# Patient Record
Sex: FEMALE | Race: BLACK OR AFRICAN AMERICAN | Employment: FULL TIME | URBAN - METROPOLITAN AREA
[De-identification: names, ages, dates, MRNs, and addresses within clinical notes are randomized per-mention and may not be internally consistent; named-entity substitution may affect disease eponyms.]

---

## 2021-07-19 ENCOUNTER — OFFICE VISIT (OUTPATIENT)
Dept: SURGERY | Age: 26
End: 2021-07-19

## 2021-07-19 ENCOUNTER — HOSPITAL ENCOUNTER (OUTPATIENT)
Dept: LAB | Age: 26
Discharge: HOME | End: 2021-07-19
Payer: COMMERCIAL

## 2021-07-19 VITALS
RESPIRATION RATE: 16 BRPM | HEART RATE: 79 BPM | DIASTOLIC BLOOD PRESSURE: 86 MMHG | WEIGHT: 293 LBS | OXYGEN SATURATION: 99 % | HEIGHT: 68 IN | SYSTOLIC BLOOD PRESSURE: 131 MMHG | BODY MASS INDEX: 44.41 KG/M2

## 2021-07-19 DIAGNOSIS — E13.10 DIABETIC KETOACIDOSIS WITHOUT COMA ASSOCIATED WITH OTHER SPECIFIED DIABETES MELLITUS (HCC): ICD-10-CM

## 2021-07-19 DIAGNOSIS — E66.01 MORBID OBESITY WITH BODY MASS INDEX (BMI) OF 40.0 TO 49.9 (HCC): Primary | ICD-10-CM

## 2021-07-19 DIAGNOSIS — I10 HYPERTENSION, UNSPECIFIED TYPE: ICD-10-CM

## 2021-07-19 DIAGNOSIS — K21.9 GASTROESOPHAGEAL REFLUX DISEASE, UNSPECIFIED WHETHER ESOPHAGITIS PRESENT: ICD-10-CM

## 2021-07-19 DIAGNOSIS — O24.419 GESTATIONAL DIABETES MELLITUS (GDM), ANTEPARTUM, GESTATIONAL DIABETES METHOD OF CONTROL UNSPECIFIED: ICD-10-CM

## 2021-07-19 DIAGNOSIS — M25.50 ARTHRALGIA, UNSPECIFIED JOINT: ICD-10-CM

## 2021-07-19 DIAGNOSIS — E66.01 MORBID OBESITY (HCC): ICD-10-CM

## 2021-07-19 PROCEDURE — 86677 HELICOBACTER PYLORI ANTIBODY: CPT

## 2021-07-19 PROCEDURE — 36415 COLL VENOUS BLD VENIPUNCTURE: CPT

## 2021-07-19 PROCEDURE — 99215 OFFICE O/P EST HI 40 MIN: CPT | Performed by: SPECIALIST

## 2021-07-19 RX ORDER — LORATADINE 10 MG/1
TABLET ORAL
COMMUNITY
Start: 2021-06-21

## 2021-07-19 RX ORDER — ERGOCALCIFEROL 1.25 MG/1
50000 CAPSULE ORAL
COMMUNITY
Start: 2021-07-07 | End: 2021-09-29

## 2021-07-19 NOTE — PATIENT INSTRUCTIONS
Ensure all pre-operative insurances requirements are complete (ie; dietary visits, psychology consults, primary care documentation, etc)    Adhere to pre-operative weight loss / weight maintenance plan discussed in the office today.     Contact the office with any questions on pre-operative clearance issues (ie; cardiology work-up, pulmonary work-up, upper GI study, ect)

## 2021-07-19 NOTE — PROGRESS NOTES
Bariatric Surgery Consultation    Subjective: The patient is a 30 y.o. obese female with a Body mass index is 45.07 kg/m². .  The patient is currently her heaviest weight for the past several years. she has been overweight since her teen years. she has been considering surgery since last year. she desires surgery at this time because of multiple health concerns and their lifestyle issues which are hindered by their weight. she has been referred by his family physician for evaluation and treatment of their obesity via surgical intervention. Sayra Medrano has tried multiple diets in her lifetime most recently tried behavior modification and unsupervised diets    Bariatric comorbidities present are   Patient Active Problem List   Diagnosis Code    Morbid obesity ([facility] E66.01    Morbid obesity with body mass index (BMI) of 40.0 to 49.9 ([facility]) E66.01    Hypertension I10    Gestational diabetes O24.419    GERD (gastroesophageal reflux disease) K21.9    Arthritic-like pain M25.50    Diabetic keto-acidosis ([facility] E11.10       The patient is considering laparoscopic gastric bypass surgery for surgical weight loss due to their ineffective progress with medical forms of weight loss and the urging of their physician who cares for their primary medical issues. The patient  now presents  for consideration for weight loss surgery understanding the benefits of this over a medical approach of weight loss as was discussed in our presentation on weight loss surgery. They have discussed their plans both with their family and primary care physician who is in support of their pursuit of such. The patient has had no health issues as of late and denies and gastrointestinal disturbances other than what is outlined below in their review of symptoms.  All of their prior evaluations available by both their PCP's and specialists physicians have been reviewed today either in the Care Everywhere portal or scanned under the media tab.    I have spent a large portion of my initial consultation today reviewing the patients current dietary habits which have contributed to their health issues and obesity. I have suggested to them personally a dietary regimen that they can initiate now to help with their status as it pertains to their weight. They understand that the most important aspect of their journey through their weight loss endeavor will be their adherence to a new lifestyle of healthy eating behavior. They also understand that an adherence to an exercise program will not only help with weight loss but is ultimately important in weight maintenance. The patients goal weight is 177 lb. Patient Active Problem List    Diagnosis Date Noted    [facility]     Morbid obesity with body mass index (BMI) of 40.0 to 49.9 ([facility]     Hypertension     Gestational diabetes     GERD (gastroesophageal reflux disease)     Arthritic-like pain     Diabetic keto-acidosis ([facility]       Past Surgical History:   Procedure Laterality Date    HX WISDOM TEETH EXTRACTION        Social History     Tobacco Use    Smoking status: Never Smoker    Smokeless tobacco: Never Used   Substance Use Topics    Alcohol use: Not Currently      Family History   Problem Relation Age of Onset    Hypertension Mother     Diabetes Father     Hypertension Father       Current Outpatient Medications   Medication Sig Dispense Refill    ergocalciferol (ERGOCALCIFEROL) 1,250 mcg (50,000 unit) capsule Take 50,000 Units by mouth.       Allergy Relief, loratadine, 10 mg tablet        No Known Allergies     Review of Systems:        General - No history or complaints of unexpected fever, chills, or weight loss  Head/Neck - No history or complaints of headache, diplopia, dysphagia, hearing loss  Cardiac - No history or complaints of chest pain, palpitations, murmur, or shortness of breath  Pulmonary - No history or complaints of shortness of breath, productive cough, hemoptysis  Gastrointestinal - mild reflux noted which is controlled with OTC meds, no abdominal pain, obstipation/constipation or blood per rectum  Genitourinary - No history or complaints of hematuria/dysuria, stress urinary incontinence symptoms, or renal lithiasis  Musculoskeletal - mild joint pain in their hips,  no muscular weakness  Hematologic - No history or complaints of bleeding disorders,  No blood transfusions  Neurologic - No history or complaints of  migraine headaches, seizure activity, syncopal episodes, TIA or stroke  Integumentary - No history or complaints of rashes, abnormal nevi, skin cancer  Gynecological - unremarkable    Objective:     Visit Vitals  /86 (BP 1 Location: Left upper arm, BP Patient Position: Sitting, BP Cuff Size: Large adult)   Pulse 79   Resp 16   Ht 5' 8\" (1.727 m)   Wt 134.4 kg (296 lb 6.4 oz)   SpO2 99%   BMI 45.07 kg/m²       Physical Examination: General appearance - alert, well appearing, and in no distress  Mental status - alert, oriented to person, place, and time  Eyes - pupils equal and reactive, extraocular eye movements intact  Ears - bilateral TM's and external ear canals normal  Nose - normal and patent, no erythema, discharge or polyps  Mouth - mucous membranes moist, pharynx normal without lesions  Neck - supple, no significant adenopathy  Lymphatics - no palpable lymphadenopathy, no hepatosplenomegaly  Chest - clear to auscultation, no wheezes, rales or rhonchi, symmetric air entry  Heart - normal rate, regular rhythm, normal S1, S2, no murmurs, rubs, clicks or gallops  Abdomen - soft, nontender, nondistended, no masses or organomegaly  Back exam - full range of motion, no tenderness, palpable spasm or pain on motion  Neurological - alert, oriented, normal speech, no focal findings or movement disorder noted  Musculoskeletal - no joint tenderness, deformity or swelling  Extremities - peripheral pulses normal, no pedal edema, no clubbing or cyanosis  Skin - normal coloration and turgor, no rashes, no suspicious skin lesions noted    Labs:     No results found for: WBC, WBCLT, HGBPOC, HGB, HGBP, HCTPOC, HCT, PHCT, RBCH, PLT, MCV, HGBEXT, HCTEXT, PLTEXT  No results found for: NA, K, CL, CO2, AGAP, GLU, BUN, CREA, BUCR, GFRAA, GFRNA, CA, TBIL, TBILI, AP, TP, ALB, GLOB, AGRAT, ALT  No results found for: IRON, FE, TIBC, IBCT, PSAT, FERR  No results found for: FOL, RBCF  No results found for: VITD3, XQVID2, XQVID3, XQVID, VD3RIA      Assessment:     Morbid obesity with associated comorbidity    Plan:     laparoscopic gastric bypass surgery    This is a 30 y.o. female with a BMI of Body mass index is 45.07 kg/m². and the weight-related co-morbidties of (as above). Jared Ware meets the NIH criteria for bariatric surgery based upon the BMI of Body mass index is 45.07 kg/m². and multiple weight-related co-morbidties. Jared Ware has elected laparoscopic gastric bypass as her intervention of choice for treatment of morbid obestiy through surgical means secondary to its uniform results,  profound baseline suppression of hunger and pace at which weight is lost.    In the office today, following Ifrah's history and physical examination, a 30 minute discussion regarding the anatomic alterations for the laparoscopic gastric bypass  was undertaken. The dietary expectations and the patient  dependent factors for success were thoroughly discussed, to include the need for interval follow-up and long-term dietary changes associated with success. The possible short and long term  complications of the gastric bypass were also discussed, to include but not limited to;death, DVT/PE, staple line leak, bleeding, stricture formation, infection,internal hernia  and pouch dilation.   Specific weight related outcomes for success were also discussed with an emphasis on careful and close follow-up with the first year and dietary behavior modification over the first years as baseline cyclical hunger returns  The patient expressed an understanding of the above factors, and her questions were answered in their entirety. In addition, the patient attended a 1.5 hour power point seminar regarding obesity, surgical weight loss including, adjustable gastric band, gastric bypass, and sleeve gastrectomy. This discussion contrasted the different surgical techniques, mechanisms of actions and expected outcomes, and surgical and medical risks associated with each procedure. During this seminar, there was a long question and answer session where each questions was answered until there were no additional questions. Today, the patient had all of her questions answered and desires to proceed with  bariatric surgery initially choosing the gastric bypass as her surgical option. Labs from last month reviewed in Care Everywhere to include normal H&H and TSH. She will meet with office staff today to discuss criteria    Secondary Diagnoses:     Dietary Intervention  - The patient is currently scheduled to see or has been followed by a bariatric nutritionist for an attempt at preoperative weight loss as has been dictated by their insurance carrier. They will be assessed at various times during their follow up to evaluate their progress depending on the length of time that is required once again by their carrier. I have explained the importance of preoperative weight loss and the benefits regarding lower surgical risk and also assisting the patient in reaching their weight loss goal.  Finally they understand their is a physiologic benefit from the standpoint of hepatic volume reduction preoperatively. I have reiterated the importance of a low carbohydrate and high protein regimen to achieve their stated goal.     GERD -The patient understands that weight loss surgery is not a guaranteed cure for reflux disease but does understand the benefits that weight loss can have on reflux disease.   They also understand that at the time of surgery the gastroesophageal junction will be evaluated for the presence of a diaphragmatic hernia. Hernias will be corrected always with the gastric band and sleeve gastrectomy procedures, but only on a case by case basis with the gastric bypass if it prevents our ability to perform the operation at hand, or if I feel that they would benefit long term with correction of this issue. The patient also understands that neither weight loss surgery nor repair of a diaphragmatic hernia repair guarantees the complete cessation of the disease. They also understand there is a possibility of recurrence with a simple crural repair as is performed with these procedures. They understand they may have to continue their medications in the postoperative period. They have a good understanding that the gastric bypass procedure is better suited to total resolution of this issue and that neither the Lap Band nor sleeve gastrectomy is considered a curative procedure as it pertains to this diagnosis. Hypertension - The patient has a clear understanding of how weight loss improves hypertension as a whole, but also they understand that there is a significant genetic component to this disease process. We will monitor the patients blood pressure while in the hospital and the plan would be to continue those medications postoperatively.  If a diuretic is being used we will stop them on discharge to prevent dehydration particularly with the sleeve gastrectomy and the gastric bypass procedures.  They will be instructed to monitor their blood pressure postoperatively while at home and notify their primary care physician in the event of any significantly high or uncharacteristic readings.     Signed By: KAVITHA Pope     July 19, 2021

## 2021-07-20 LAB
H PYLORI IGA SER-ACNC: 11.9 UNITS (ref 0–8.9)
H PYLORI IGG SER IA-ACNC: 4.45 INDEX VALUE (ref 0–0.79)

## 2021-07-23 ENCOUNTER — TELEPHONE (OUTPATIENT)
Dept: SURGERY | Age: 26
End: 2021-07-23

## 2021-07-23 RX ORDER — CLARITHROMYCIN 500 MG/1
500 TABLET, FILM COATED ORAL 2 TIMES DAILY
Qty: 28 TABLET | Refills: 0 | Status: SHIPPED | OUTPATIENT
Start: 2021-07-23 | End: 2021-08-06

## 2021-07-23 RX ORDER — OMEPRAZOLE 20 MG/1
20 CAPSULE, DELAYED RELEASE ORAL
Qty: 28 CAPSULE | Refills: 0 | Status: SHIPPED | OUTPATIENT
Start: 2021-07-23 | End: 2021-08-06

## 2021-07-23 RX ORDER — METRONIDAZOLE 500 MG/1
500 TABLET ORAL 2 TIMES DAILY
Qty: 28 TABLET | Refills: 0 | Status: SHIPPED | OUTPATIENT
Start: 2021-07-23 | End: 2021-08-06

## 2021-07-23 NOTE — TELEPHONE ENCOUNTER
I called patient and explained that she has an H pylori infection and gave patient education on the condition. I verified that she has no drug allergies and sent triple therapy to her pharmacy. Patient is to contact our office with any problems, worsening of condition, questions or concerns.

## 2021-07-23 NOTE — TELEPHONE ENCOUNTER
----- Message from Sosa [place] sent at 7/22/2021  9:20 AM EDT -----  Please treat for h pylori

## 2021-08-03 PROBLEM — E66.01 MORBID OBESITY (HCC): Status: RESOLVED | Noted: 2021-08-03 | Resolved: 2021-08-03

## 2021-08-05 ENCOUNTER — OFFICE VISIT (OUTPATIENT)
Dept: SURGERY | Age: 26
End: 2021-08-05

## 2021-08-05 VITALS — WEIGHT: 293 LBS | BODY MASS INDEX: 44.41 KG/M2 | HEIGHT: 68 IN

## 2021-08-05 DIAGNOSIS — E66.01 MORBID OBESITY (HCC): Primary | ICD-10-CM

## 2021-08-05 NOTE — PROGRESS NOTES
Medical Weight Loss Multi-Disciplinary Program    Name: Mihir Hassan   : 1995    Session# 1, Pt attended in-person class. Weight obtained in office. Date: 2021    Visit Vitals  Ht 5' 8\" (1.727 m)   Wt 133.4 kg (294 lb 3.2 oz)   BMI 44.73 kg/m²       Pt has LOST 2.2  lbs since initial consult visit on 2021. Dietary Instructions    Reviewed intake  Understanding low carbohydrates, low sugar, higher protein meals  Instruction given for personal dietary changes  Discussed perceived compliance  Comments: RD Reviewed Diet History and Physical Activity/Exercise habits. Recommended dietary changes discussed for both before and after surgery. Reviewed recommendation to follow [id number] calorie diet, working to reduce total carbohydrate intake to  g or less per day and increasing protein intake to  g per day, compared current intake to recommendations. Recommend pt adopt an exercise routine of at least 3-5 days per week for at least 30 minutes/day. If pt unable to participate in walking, gricelda, swimming, or other exercises, recommend pt work with a physical therapist and/or participate in chair exercises, yoga, and/or increase activities of daily living, as able. Patient participated in pre-recorded education class video. Recorded video of dietitian discussing key diet principles following weight loss surgery, importance of high protein diet, sources of protein, tips for following low carbohydrate diet, and ways to measure carbohydrates utilizing carbohydrate counting. Discussed importance of sampling protein supplements, protein supplement label guidelines and popularly selected items. Also reviewed the key vitamins and minerals to supplement long term after surgery. But these vitamins will be reviewed again in a pre-operative class. Patient watched the video in its entirety and turned in the 3 embedded passcodes from the video session.       Behavior Modification    Identify obstacles to trigger change  Achieving/Rewarding goals met  Positive attitude  Comments: Reinforced importance continuing to modify lifestyle patterns and behaviors to promote weight loss and long term weight maintenance. I talked to patient about the importance of taking vitamins post op and we reviewed the vitamins that patients will be taking post op. Patient will hear this again at pre op class before surgery. Patient had the opportunity to ask questions about these vitamins that will be lifelong. Comments:  Pt set personal behavior goals as related to pre- and post-surgical recommendations and diet key principles. Recommend pt track progress and set SMART goals around post-op diet key principles. Pt completed Bariatric-specific nutrition questionnaire. RD reviewed answers and provided feedback on responses that align with bariatric recommendations to behavior changes. Provided feedback on recommendations, areas for improvement, and provided positive feedback on areas where pt is making positive behavior changes. Pt questionnaire is included in chart separate from this note. All current stated pt questions answered. Goals:   1. Work to increase to 3-4 small meals per day, with planned snacks as needed. Recommend following plate method for meal planning - focusing on lean protein, non-starchy vegetables, and measured amounts of starch. - Goal of  g protein and  g carbohydrate per day. - Recommend continuing protein supplement as meal replacement at least 1x/day OR as high protein snack option  2. Increase non caloric fluid to 64 oz per day. Eliminate caffeine, added sugar, carbonation, and straws.               -Continue to work to decrease sugar sweetened beverages - goal of calorie free beverages only              -Must eliminate caffeine prior to surgery and avoid for ~6-8 weeks   -Practice 30:30 rule,  food and flood   3.  Start activity regimen, work to increase ADL  4. Start Complete MVI    Candidate for surgery (per RD):  PENDING

## 2021-08-11 ENCOUNTER — HOSPITAL ENCOUNTER (EMERGENCY)
Age: 26
Discharge: HOME | End: 2021-08-11
Attending: EMERGENCY MEDICINE
Payer: COMMERCIAL

## 2021-08-11 VITALS
TEMPERATURE: 97.7 F | HEIGHT: 68 IN | HEART RATE: 91 BPM | DIASTOLIC BLOOD PRESSURE: 81 MMHG | SYSTOLIC BLOOD PRESSURE: 105 MMHG | OXYGEN SATURATION: 99 % | RESPIRATION RATE: 14 BRPM | WEIGHT: 293 LBS | BODY MASS INDEX: 44.41 KG/M2

## 2021-08-11 DIAGNOSIS — R53.81 MALAISE AND FATIGUE: ICD-10-CM

## 2021-08-11 DIAGNOSIS — R53.83 MALAISE AND FATIGUE: ICD-10-CM

## 2021-08-11 DIAGNOSIS — M25.473 ANKLE SWELLING, UNSPECIFIED LATERALITY: ICD-10-CM

## 2021-08-11 DIAGNOSIS — K59.1 FUNCTIONAL DIARRHEA: Primary | ICD-10-CM

## 2021-08-11 LAB
ALBUMIN SERPL-MCNC: 3.9 G/DL (ref 3.4–5)
ALBUMIN/GLOB SERPL: 1 {RATIO} (ref 0.8–1.7)
ALP SERPL-CCNC: 41 U/L (ref 45–117)
ALT SERPL-CCNC: 84 U/L (ref 13–56)
ANION GAP SERPL CALC-SCNC: 5 MMOL/L (ref 3–18)
AST SERPL-CCNC: 58 U/L (ref 10–38)
BASOPHILS # BLD: 0 K/UL (ref 0–0.1)
BASOPHILS NFR BLD: 1 % (ref 0–2)
BILIRUB SERPL-MCNC: 0.4 MG/DL (ref 0.2–1)
BUN SERPL-MCNC: 9 MG/DL (ref 7–18)
BUN/CREAT SERPL: 12 (ref 12–20)
CALCIUM SERPL-MCNC: 9.3 MG/DL (ref 8.5–10.1)
CHLORIDE SERPL-SCNC: 105 MMOL/L (ref 100–111)
CO2 SERPL-SCNC: 28 MMOL/L (ref 21–32)
CREAT SERPL-MCNC: 0.74 MG/DL (ref 0.6–1.3)
DIFFERENTIAL METHOD BLD: ABNORMAL
EOSINOPHIL # BLD: 0.1 K/UL (ref 0–0.4)
EOSINOPHIL NFR BLD: 2 % (ref 0–5)
ERYTHROCYTE [DISTWIDTH] IN BLOOD BY AUTOMATED COUNT: 11.7 % (ref 11.6–14.5)
GLOBULIN SER CALC-MCNC: 3.8 G/DL (ref 2–4)
GLUCOSE SERPL-MCNC: 144 MG/DL (ref 74–99)
HCT VFR BLD AUTO: 36.7 % (ref 35–45)
HGB BLD-MCNC: 12.5 G/DL (ref 12–16)
LYMPHOCYTES # BLD: 0.8 K/UL (ref 0.9–3.6)
LYMPHOCYTES NFR BLD: 26 % (ref 21–52)
MCH RBC QN AUTO: 31.4 PG (ref 24–34)
MCHC RBC AUTO-ENTMCNC: 34.1 G/DL (ref 31–37)
MCV RBC AUTO: 92.2 FL (ref 74–97)
MONOCYTES # BLD: 0.4 K/UL (ref 0.05–1.2)
MONOCYTES NFR BLD: 12 % (ref 3–10)
NEUTS SEG # BLD: 1.8 K/UL (ref 1.8–8)
NEUTS SEG NFR BLD: 59 % (ref 40–73)
PLATELET # BLD AUTO: 189 K/UL (ref 135–420)
PMV BLD AUTO: 10.9 FL (ref 9.2–11.8)
POTASSIUM SERPL-SCNC: 4.2 MMOL/L (ref 3.5–5.5)
PROT SERPL-MCNC: 7.7 G/DL (ref 6.4–8.2)
RBC # BLD AUTO: 3.98 M/UL (ref 4.2–5.3)
SODIUM SERPL-SCNC: 138 MMOL/L (ref 136–145)
WBC # BLD AUTO: 3.1 K/UL (ref 4.6–13.2)

## 2021-08-11 PROCEDURE — 80053 COMPREHEN METABOLIC PANEL: CPT

## 2021-08-11 PROCEDURE — 74011250637 HC RX REV CODE- 250/637: Performed by: EMERGENCY MEDICINE

## 2021-08-11 PROCEDURE — 99283 EMERGENCY DEPT VISIT LOW MDM: CPT

## 2021-08-11 PROCEDURE — 74011250636 HC RX REV CODE- 250/636: Performed by: EMERGENCY MEDICINE

## 2021-08-11 PROCEDURE — 85025 COMPLETE CBC W/AUTO DIFF WBC: CPT

## 2021-08-11 RX ORDER — LOPERAMIDE HCL 2 MG
2 TABLET ORAL
Qty: 20 TABLET | Refills: 0 | Status: SHIPPED | OUTPATIENT
Start: 2021-08-11 | End: 2021-08-21

## 2021-08-11 RX ORDER — LOPERAMIDE HYDROCHLORIDE 2 MG/1
4 CAPSULE ORAL
Status: COMPLETED | OUTPATIENT
Start: 2021-08-11 | End: 2021-08-11

## 2021-08-11 RX ADMIN — SODIUM CHLORIDE 500 ML: 900 INJECTION, SOLUTION INTRAVENOUS at 08:33

## 2021-08-11 RX ADMIN — LOPERAMIDE HYDROCHLORIDE 4 MG: 2 CAPSULE ORAL at 08:34

## 2021-08-11 NOTE — ED PROVIDER NOTES
EMERGENCY DEPARTMENT HISTORY AND PHYSICAL EXAM      Date: 8/11/2021  Patient Name: Mihir Hassan  Patient Age and Sex: 27 y.o. female     History of Presenting Illness     Chief Complaint   Patient presents with    Foot Swelling    Dizziness       History Provided By: Patient    HPI: Mihir Hassan is a 49-year-old female presenting with few different complaints. Patient states that over the past 3 days has been having some diarrhea. Denies any abdominal pain, nausea, vomiting, dysuria or hematuria with that. Patient states that she feels like she might be a little dehydrated because she is also feeling a little lightheaded. Also noticed some bilateral ankle and foot swelling. Patient states that she is not on her feet all the time but she does have 4 kids. No history of liver, renal or heart failure in the past.    There are no other complaints, changes, or physical findings at this time. PCP: Yamile Leonardo NP    No current facility-administered medications on file prior to encounter. Current Outpatient Medications on File Prior to Encounter   Medication Sig Dispense Refill    ergocalciferol (ERGOCALCIFEROL) 1,250 mcg (50,000 unit) capsule Take 50,000 Units by mouth.       Allergy Relief, loratadine, 10 mg tablet          Past History     Past Medical History:  Past Medical History:   Diagnosis Date    Arthritic-like pain     hips and knees    Diabetic keto-acidosis ([address]     history of during pregnancy ([place])    GERD (gastroesophageal reflux disease)     uses OTC meds    Gestational diabetes     Hypertension     has required medication in the past (none as of July 2021)    Morbid obesity ([address]     Morbid obesity with body mass index (BMI) of 40.0 to 49.9 ([facility])        Past Surgical History:  Past Surgical History:   Procedure Laterality Date    HX WISDOM TEETH EXTRACTION         Family History:  Family History   Problem Relation Age of Onset    Hypertension Mother     Diabetes Father     Hypertension Father        Social History:  Social History     Tobacco Use    Smoking status: Never Smoker    Smokeless tobacco: Never Used   Vaping Use    Vaping Use: Never used   Substance Use Topics    Alcohol use: Not Currently    Drug use: Never       Allergies:  No Known Allergies      Review of Systems   Review of Systems   Constitutional: Positive for fatigue. Negative for chills and fever. Respiratory: Negative for cough and shortness of breath. Cardiovascular: Positive for leg swelling. Negative for chest pain. Gastrointestinal: Positive for diarrhea. Negative for abdominal pain, constipation, nausea and vomiting. Genitourinary: Negative for dysuria, frequency and hematuria. Neurological: Positive for light-headedness. Negative for weakness and numbness. All other systems reviewed and are negative. Physical Exam   Physical Exam  Vitals and nursing note reviewed. Constitutional:       Appearance: She is well-developed. She is obese. HENT:      Head: Normocephalic and atraumatic. Nose: Nose normal.      Mouth/Throat:      Mouth: Mucous membranes are moist.   Eyes:      Extraocular Movements: Extraocular movements intact. Conjunctiva/sclera: Conjunctivae normal.   Cardiovascular:      Rate and Rhythm: Normal rate and regular rhythm. Pulmonary:      Effort: Pulmonary effort is normal. No respiratory distress. Breath sounds: Normal breath sounds. Abdominal:      General: There is no distension. Palpations: Abdomen is soft. There is no mass. Tenderness: There is no abdominal tenderness. Hernia: No hernia is present. Musculoskeletal:         General: Normal range of motion. Cervical back: Normal range of motion and neck supple. Comments: Patient's ankles are slightly swollen but no pitting edema noted. Skin:     General: Skin is warm and dry. Neurological:      General: No focal deficit present.       Mental Status: She is alert and oriented to person, place, and time. Mental status is at baseline. Psychiatric:         Mood and Affect: Mood normal.          Diagnostic Study Results     Labs -     Recent Results (from the past 12 hour(s))   CBC WITH AUTOMATED DIFF    Collection Time: 08/11/21  8:15 AM   Result Value Ref Range    WBC 3.1 (L) 4.6 - 13.2 K/uL    RBC 3.98 (L) 4.20 - 5.30 M/uL    HGB 12.5 12.0 - 16.0 g/dL    HCT 36.7 35.0 - 45.0 %    MCV 92.2 74.0 - 97.0 FL    MCH 31.4 24.0 - 34.0 PG    MCHC 34.1 31.0 - 37.0 g/dL    RDW 11.7 11.6 - 14.5 %    PLATELET 081 779 - 907 K/uL    MPV 10.9 9.2 - 11.8 FL    NEUTROPHILS 59 40 - 73 %    LYMPHOCYTES 26 21 - 52 %    MONOCYTES 12 (H) 3 - 10 %    EOSINOPHILS 2 0 - 5 %    BASOPHILS 1 0 - 2 %    ABS. NEUTROPHILS 1.8 1.8 - 8.0 K/UL    ABS. LYMPHOCYTES 0.8 (L) 0.9 - 3.6 K/UL    ABS. MONOCYTES 0.4 0.05 - 1.2 K/UL    ABS. EOSINOPHILS 0.1 0.0 - 0.4 K/UL    ABS. BASOPHILS 0.0 0.0 - 0.1 K/UL    DF AUTOMATED     METABOLIC PANEL, COMPREHENSIVE    Collection Time: 08/11/21  8:15 AM   Result Value Ref Range    Sodium 138 136 - 145 mmol/L    Potassium 4.2 3.5 - 5.5 mmol/L    Chloride 105 100 - 111 mmol/L    CO2 28 21 - 32 mmol/L    Anion gap 5 3.0 - 18 mmol/L    Glucose 144 (H) 74 - 99 mg/dL    BUN 9 7.0 - 18 MG/DL    Creatinine 0.74 0.6 - 1.3 MG/DL    BUN/Creatinine ratio 12 12 - 20      GFR est AA >60 >60 ml/min/1.73m2    GFR est non-AA >60 >60 ml/min/1.73m2    Calcium 9.3 8.5 - 10.1 MG/DL    Bilirubin, total 0.4 0.2 - 1.0 MG/DL    ALT (SGPT) 84 (H) 13 - 56 U/L    AST (SGOT) 58 (H) 10 - 38 U/L    Alk. phosphatase 41 (L) 45 - 117 U/L    Protein, total 7.7 6.4 - 8.2 g/dL    Albumin 3.9 3.4 - 5.0 g/dL    Globulin 3.8 2.0 - 4.0 g/dL    A-G Ratio 1.0 0.8 - 1.7         Radiologic Studies -   No orders to display     CT Results  (Last 48 hours)    None        CXR Results  (Last 48 hours)    None            Medical Decision Making   I am the first provider for this patient.     I reviewed the vital signs, available nursing notes, past medical history, past surgical history, family history and social history. Vital Signs-Reviewed the patient's vital signs. Patient Vitals for the past 12 hrs:   Temp Pulse Resp BP SpO2   08/11/21 0751 97.7 °F (36.5 °C) 91 14 105/81 99 %       Records Reviewed: Nursing Notes and Old Medical Records    Provider Notes (Medical Decision Making):   Patient presenting with diarrhea, lightheadedness, fatigue and ankle swelling. Regarding the lightheadedness and such could be dehydration, electrolyte abnormality all related to the diarrhea. Most likely viral infection. Has a very benign abdominal exam.  Could also be related to stress or anxiety. Regarding her ankle swelling, could be salt or heat influenced. Less likely any organ failure. Could also be a venous stasis. ED Course:   Initial assessment performed. The patients presenting problems have been discussed, and they are in agreement with the care plan formulated and outlined with them. I have encouraged them to ask questions as they arise throughout their visit. Critical Care Time:   0    Disposition:  Discharge Note:  The patient has been re-evaluated and is ready for discharge. Reviewed available results with patient. Counseled patient on diagnosis and care plan. Patient has expressed understanding, and all questions have been answered. Patient agrees with plan and agrees to follow up as recommended, or to return to the ED if their symptoms worsen. Discharge instructions have been provided and explained to the patient, along with reasons to return to the ED. PLAN:  Discharge Medication List as of 8/11/2021  8:58 AM      START taking these medications    Details   loperamide (Imodium A-D) 2 mg tablet Take 1 Tablet by mouth four (4) times daily as needed for Diarrhea for up to 10 days. , Normal, Disp-20 Tablet, R-0         CONTINUE these medications which have NOT CHANGED    Details   ergocalciferol (ERGOCALCIFEROL) 1,250 mcg (50,000 unit) capsule Take 50,000 Units by mouth., Historical Med      Allergy Relief, loratadine, 10 mg tablet Historical Med, PRETTY           2. Follow-up Information     Follow up With Specialties Details Why Contact Info    James Baird NP Family Medicine Schedule an appointment as soon as possible for a visit   [address] [place] [address]  [address] [phone]          3. Return to ED if worse     Diagnosis     Clinical Impression:   1. Functional diarrhea    2. Malaise and fatigue    3. Ankle swelling, unspecified laterality        Attestations:    Kathie Fernández M.D. Please note that this dictation was completed with icix, the computer voice recognition software. Quite often unanticipated grammatical, syntax, homophones, and other interpretive errors are inadvertently transcribed by the computer software. Please disregard these errors. Please excuse any errors that have escaped final proofreading. Thank you.

## 2021-08-11 NOTE — DISCHARGE INSTRUCTIONS
You were seen for diarrhea, fatigue. Your lab work came back negative for any electrolyte abnormalities or any bacterial cause of the diarrhea. You were given some IV fluids for hydration. For your ankle swelling, please start wearing compression socks to help and keep those legs elevated. Avoid significant heat as well as salt intake.